# Patient Record
Sex: FEMALE | Race: WHITE | NOT HISPANIC OR LATINO
[De-identification: names, ages, dates, MRNs, and addresses within clinical notes are randomized per-mention and may not be internally consistent; named-entity substitution may affect disease eponyms.]

---

## 2017-09-01 PROBLEM — Z00.00 ENCOUNTER FOR PREVENTIVE HEALTH EXAMINATION: Status: ACTIVE | Noted: 2017-09-01

## 2017-09-11 ENCOUNTER — APPOINTMENT (OUTPATIENT)
Dept: PULMONOLOGY | Facility: CLINIC | Age: 62
End: 2017-09-11
Payer: COMMERCIAL

## 2017-09-11 VITALS
TEMPERATURE: 98.7 F | HEART RATE: 74 BPM | BODY MASS INDEX: 22.5 KG/M2 | HEIGHT: 63 IN | DIASTOLIC BLOOD PRESSURE: 68 MMHG | WEIGHT: 127 LBS | SYSTOLIC BLOOD PRESSURE: 100 MMHG | OXYGEN SATURATION: 98 %

## 2017-09-11 DIAGNOSIS — Z86.39 PERSONAL HISTORY OF OTHER ENDOCRINE, NUTRITIONAL AND METABOLIC DISEASE: ICD-10-CM

## 2017-09-11 DIAGNOSIS — Z91.09 OTHER ALLERGY STATUS, OTHER THAN TO DRUGS AND BIOLOGICAL SUBSTANCES: ICD-10-CM

## 2017-09-11 DIAGNOSIS — Z82.5 FAMILY HISTORY OF ASTHMA AND OTHER CHRONIC LOWER RESPIRATORY DISEASES: ICD-10-CM

## 2017-09-11 DIAGNOSIS — Z87.891 PERSONAL HISTORY OF NICOTINE DEPENDENCE: ICD-10-CM

## 2017-09-11 DIAGNOSIS — R91.1 SOLITARY PULMONARY NODULE: ICD-10-CM

## 2017-09-11 PROCEDURE — 71020: CPT

## 2017-09-11 PROCEDURE — 99244 OFF/OP CNSLTJ NEW/EST MOD 40: CPT | Mod: 25

## 2017-09-11 PROCEDURE — 94010 BREATHING CAPACITY TEST: CPT

## 2017-09-11 RX ORDER — LEVOTHYROXINE SODIUM 0.17 MG/1
TABLET ORAL
Refills: 0 | Status: ACTIVE | COMMUNITY

## 2018-05-05 ENCOUNTER — EMERGENCY (EMERGENCY)
Facility: HOSPITAL | Age: 63
LOS: 1 days | Discharge: HOME | End: 2018-05-05
Attending: EMERGENCY MEDICINE | Admitting: EMERGENCY MEDICINE
Payer: COMMERCIAL

## 2018-05-05 VITALS
HEART RATE: 67 BPM | WEIGHT: 130.07 LBS | TEMPERATURE: 98 F | OXYGEN SATURATION: 99 % | HEIGHT: 63 IN | DIASTOLIC BLOOD PRESSURE: 67 MMHG | SYSTOLIC BLOOD PRESSURE: 101 MMHG | RESPIRATION RATE: 17 BRPM

## 2018-05-05 DIAGNOSIS — M79.674 PAIN IN RIGHT TOE(S): ICD-10-CM
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
## 2018-05-05 DIAGNOSIS — S90.121A CONTUSION OF RIGHT LESSER TOE(S) WITHOUT DAMAGE TO NAIL, INITIAL ENCOUNTER: ICD-10-CM

## 2018-05-05 PROCEDURE — 73620 X-RAY EXAM OF FOOT: CPT | Mod: 26,RT

## 2018-05-05 PROCEDURE — 99283 EMERGENCY DEPT VISIT LOW MDM: CPT | Mod: 25

## 2018-05-05 RX ADMIN — Medication 500 MILLIGRAM(S): at 12:57

## 2018-05-05 NOTE — ED ADULT TRIAGE NOTE - CHIEF COMPLAINT QUOTE
c/o right 4th toe pain after hitting against ottoman. +tenderness +swelling, cody taped by PMD yesterday. ambulates with steady gait. took hydrocodone with effectiveness.

## 2018-05-05 NOTE — ED PROVIDER NOTE - OBJECTIVE STATEMENT
64 y/o F w/ no PMH who presents w/ R 4th toe pain, sharp in nature, and stubbing it on a new ottoman yesterday. She was seen by her PCP yesterday who applied cody tape splint and was told she needs an XR to rule out fracture. Denies other injuries, numbness, tingling. 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

## 2018-05-05 NOTE — ED PROVIDER NOTE - DIAGNOSTIC INTERPRETATION
Interpreted by MD  right foot _ x-ray, 4_ views  No fracture, no dislocation (joint spaces grossly normal), no Foreign Body noted, soft tissue normal

## 2020-01-08 ENCOUNTER — APPOINTMENT (OUTPATIENT)
Dept: PULMONOLOGY | Facility: CLINIC | Age: 65
End: 2020-01-08

## 2022-12-15 ENCOUNTER — APPOINTMENT (OUTPATIENT)
Dept: PULMONOLOGY | Facility: CLINIC | Age: 67
End: 2022-12-15

## 2022-12-15 ENCOUNTER — TRANSCRIPTION ENCOUNTER (OUTPATIENT)
Age: 67
End: 2022-12-15

## 2022-12-15 VITALS
TEMPERATURE: 98 F | WEIGHT: 140 LBS | HEIGHT: 63 IN | HEART RATE: 88 BPM | OXYGEN SATURATION: 97 % | DIASTOLIC BLOOD PRESSURE: 70 MMHG | SYSTOLIC BLOOD PRESSURE: 130 MMHG | BODY MASS INDEX: 24.8 KG/M2

## 2022-12-15 DIAGNOSIS — G47.19 OTHER HYPERSOMNIA: ICD-10-CM

## 2022-12-15 DIAGNOSIS — J45.909 UNSPECIFIED ASTHMA, UNCOMPLICATED: ICD-10-CM

## 2022-12-15 PROCEDURE — 95012 NITRIC OXIDE EXP GAS DETER: CPT

## 2022-12-15 PROCEDURE — 94060 EVALUATION OF WHEEZING: CPT

## 2022-12-15 PROCEDURE — 94729 DIFFUSING CAPACITY: CPT

## 2022-12-15 PROCEDURE — 94727 GAS DIL/WSHOT DETER LNG VOL: CPT

## 2022-12-15 PROCEDURE — 71046 X-RAY EXAM CHEST 2 VIEWS: CPT

## 2022-12-15 PROCEDURE — 99205 OFFICE O/P NEW HI 60 MIN: CPT | Mod: 25

## 2022-12-15 RX ORDER — BUPROPION HYDROCHLORIDE 75 MG/1
TABLET, FILM COATED ORAL
Refills: 0 | Status: DISCONTINUED | COMMUNITY
End: 2022-12-15

## 2022-12-15 RX ORDER — ALBUTEROL 90 MCG
90 AEROSOL (GRAM) INHALATION
Refills: 0 | Status: DISCONTINUED | COMMUNITY
End: 2022-12-15

## 2022-12-15 RX ORDER — OMALIZUMAB 202.5 MG/1.4ML
INJECTION, SOLUTION SUBCUTANEOUS
Refills: 0 | Status: DISCONTINUED | COMMUNITY
End: 2022-12-15

## 2022-12-15 RX ORDER — FLUTICASONE FUROATE AND VILANTEROL TRIFENATATE 50; 25 UG/1; UG/1
POWDER RESPIRATORY (INHALATION)
Refills: 0 | Status: DISCONTINUED | COMMUNITY
End: 2022-12-15

## 2022-12-15 RX ORDER — FLUTICASONE PROPIONATE AND SALMETEROL 50; 250 UG/1; UG/1
250-50 POWDER RESPIRATORY (INHALATION)
Refills: 0 | Status: ACTIVE | COMMUNITY

## 2022-12-15 RX ORDER — MONTELUKAST SODIUM 10 MG/1
10 TABLET, FILM COATED ORAL
Refills: 0 | Status: DISCONTINUED | COMMUNITY
End: 2022-12-15

## 2022-12-15 RX ORDER — FLUTICASONE FUROATE, UMECLIDINIUM BROMIDE AND VILANTEROL TRIFENATATE 100; 62.5; 25 UG/1; UG/1; UG/1
100-62.5-25 POWDER RESPIRATORY (INHALATION)
Qty: 1 | Refills: 5 | Status: ACTIVE | COMMUNITY
Start: 2022-12-15 | End: 1900-01-01

## 2022-12-15 RX ORDER — LORAZEPAM 1 MG/1
1 TABLET ORAL
Refills: 0 | Status: ACTIVE | COMMUNITY

## 2022-12-15 RX ORDER — FAMOTIDINE 10 MG/1
TABLET, FILM COATED ORAL
Refills: 0 | Status: ACTIVE | COMMUNITY

## 2022-12-15 RX ORDER — TIOTROPIUM BROMIDE 18 UG/1
CAPSULE ORAL; RESPIRATORY (INHALATION)
Refills: 0 | Status: DISCONTINUED | COMMUNITY
End: 2022-12-15

## 2022-12-15 RX ORDER — CARBIDOPA AND LEVODOPA 25; 250 MG/1; MG/1
TABLET ORAL
Refills: 0 | Status: ACTIVE | COMMUNITY

## 2022-12-15 NOTE — CONSULT LETTER
[Dear  ___] : Dear  [unfilled], [Courtesy Letter:] : I had the pleasure of seeing your patient, [unfilled], in my office today. [Please see my note below.] : Please see my note below. [Consult Closing:] : Thank you very much for allowing me to participate in the care of this patient.  If you have any questions, please do not hesitate to contact me. [Sincerely,] : Sincerely, [FreeTextEntry2] : Luz Elena Rodriguez MD\par [address]\[place] [address] [FreeTextEntry3] : Mariajose Chow MD

## 2022-12-15 NOTE — ASSESSMENT
[FreeTextEntry1] : Data reviewed:\par \par PA/lat CXR in office 12/15/22: clear lungs\par \par Spirometry 9/11/17: borderline obstruction\par PFT 12/15/22: mod fixed obstruction, FEV1 60%, normal TLC and DLCO / FENO 12 (did not take Advair this AM)\par \par Impression:\par Asthma with COPD on Advair once daily\par Snoring and EDS\par \par Plan:\par She needs to take her inhalers more consistently but will go ahead and step up to Trelegy qd from Advair.\par Cont Ventolin prn.\par I suspect her dyspnea is multifactorial given her multiple problems.\par Home sleep test.\par Follow in a couple of months.\par \par \par \par  \par \par

## 2022-12-15 NOTE — HISTORY OF PRESENT ILLNESS
[TextBox_4] : 9/11/17: Asked to evaluate patient by Dr Luz Elena Rodriguez for dyspnea. She's been feeling breathless and lightheaded. A head CT was negative. Says she has had asthma since 18 mos of age. As a young adult had many ED visits for asthma. S/p immunotherapy. When she became dyspneic now she had a workup by Dr Damon which was fine. But her lung function test was abnormal. She's on Breo, Spiriva and Singulair for about a year but not faithful about taking them till last week. Also on Xolair x 6 mos for allergies/eczema. Has used steroids for the eczema but not for the asthma in the last year or so. Was at [facility] on 9/11. Dyspneic going up stairs, running for bus. Smoked in her teens only. \par  \par 12/15/22: Here w her  referred back by Dr Rodriguez. Has had Covid twice in interim in Jan 2021 and again about 4 mos ago w Paxlovid. Has had Covid vax x 4 and delayed bivalent booster due to having Covid. 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 She had been fainting but since then has not fainted. The eczema remains severe and required aggressive treatment to get her skin healthy enough for surgery. Her EPS was 9 and her  does not daytime sleepiness and nodding off as well as snoring. Cares for 94yo mother; daughter is learning disabled; a lot of stress. Has been diagnosed w Parkinson's. Incontinence. Muscular pain. Her breathing has been worse since having Covid the first time. Can barely walk a block. Taking Advair once a day and forgets second dose, and Ventolin. Not on Spiriva or montelukast, not on Xolair due to lack of effect. Took Dupixent for 6 mos from her derm and no benefit to her eczema or asthma. Off this now. Past year no exacerbations requiring steroids or UCC/ED. [ESS] : 9